# Patient Record
Sex: MALE | Race: WHITE | NOT HISPANIC OR LATINO | Employment: STUDENT | URBAN - METROPOLITAN AREA
[De-identification: names, ages, dates, MRNs, and addresses within clinical notes are randomized per-mention and may not be internally consistent; named-entity substitution may affect disease eponyms.]

---

## 2017-06-16 ENCOUNTER — OFFICE VISIT (OUTPATIENT)
Dept: OPTOMETRY | Facility: CLINIC | Age: 22
End: 2017-06-16
Payer: COMMERCIAL

## 2017-06-16 DIAGNOSIS — S05.02XD CORNEA ABRASION, LEFT, SUBSEQUENT ENCOUNTER: Primary | ICD-10-CM

## 2017-06-16 DIAGNOSIS — H43.812 PVD (POSTERIOR VITREOUS DETACHMENT), LEFT: ICD-10-CM

## 2017-06-16 PROCEDURE — 92004 COMPRE OPH EXAM NEW PT 1/>: CPT | Mod: S$GLB,,, | Performed by: OPTOMETRIST

## 2017-06-16 PROCEDURE — 99999 PR PBB SHADOW E&M-EST. PATIENT-LVL II: CPT | Mod: PBBFAC,,, | Performed by: OPTOMETRIST

## 2017-06-16 NOTE — PROGRESS NOTES
HPI     Shankar is here today for corneal abrasion f/u march 2017 and annual eye   exam.  Pt sts he experiences minor burning OS when water gets in OS.  Pt sts sometimes when focusing he will get a hair like string in vision   that tends to go away.    (-)Flashes (+)Floaters  (-)Itch, (-)tear, (-)burn, (-)Dryness. (-) OTC Drops   (-)Photophobia  (-)Glare (-)diplopia (-) headaches           Last edited by VIRIDIANA Medel on 6/16/2017 10:52 AM. (History)            Assessment /Plan     For exam results, see Encounter Report.    Cornea abrasion, left, subsequent encounter  -no abrasion noted Today  -reviewed s/sx RCE  -ATs PRN    PVD (posterior vitreous detachment), left  -Reviewed signs and symptoms of retinal detachment. Pt instructed to return to clinic immediately with flashes, floaters, or veil of darkness in vision.        RTC  1yr

## 2021-08-27 ENCOUNTER — IMMUNIZATION (OUTPATIENT)
Dept: URGENT CARE | Facility: CLINIC | Age: 26
End: 2021-08-27
Payer: OTHER GOVERNMENT

## 2021-08-27 DIAGNOSIS — Z23 NEED FOR VACCINATION: Primary | ICD-10-CM

## 2021-08-27 PROCEDURE — 91303 COVID-19,VECTOR-NR,RS-AD26,PF,0.5 ML DOSE VACCINE (JANSSEN): CPT | Mod: S$GLB,,, | Performed by: STUDENT IN AN ORGANIZED HEALTH CARE EDUCATION/TRAINING PROGRAM

## 2021-08-27 PROCEDURE — 0031A COVID-19,VECTOR-NR,RS-AD26,PF,0.5 ML DOSE VACCINE (JANSSEN): CPT | Mod: CV19,S$GLB,, | Performed by: STUDENT IN AN ORGANIZED HEALTH CARE EDUCATION/TRAINING PROGRAM

## 2021-08-27 PROCEDURE — 0031A COVID-19,VECTOR-NR,RS-AD26,PF,0.5 ML DOSE VACCINE (JANSSEN): ICD-10-PCS | Mod: CV19,S$GLB,, | Performed by: STUDENT IN AN ORGANIZED HEALTH CARE EDUCATION/TRAINING PROGRAM

## 2021-08-27 PROCEDURE — 91303 COVID-19,VECTOR-NR,RS-AD26,PF,0.5 ML DOSE VACCINE (JANSSEN): ICD-10-PCS | Mod: S$GLB,,, | Performed by: STUDENT IN AN ORGANIZED HEALTH CARE EDUCATION/TRAINING PROGRAM
